# Patient Record
Sex: MALE | Race: WHITE | NOT HISPANIC OR LATINO | Employment: FULL TIME | URBAN - METROPOLITAN AREA
[De-identification: names, ages, dates, MRNs, and addresses within clinical notes are randomized per-mention and may not be internally consistent; named-entity substitution may affect disease eponyms.]

---

## 2017-01-13 ENCOUNTER — OFFICE VISIT (OUTPATIENT)
Dept: INTERNAL MEDICINE | Facility: CLINIC | Age: 47
End: 2017-01-13

## 2017-01-13 VITALS
BODY MASS INDEX: 28.2 KG/M2 | WEIGHT: 212.8 LBS | DIASTOLIC BLOOD PRESSURE: 85 MMHG | HEIGHT: 73 IN | HEART RATE: 92 BPM | OXYGEN SATURATION: 98 % | SYSTOLIC BLOOD PRESSURE: 135 MMHG

## 2017-01-13 DIAGNOSIS — F33.9 EPISODE OF RECURRENT MAJOR DEPRESSIVE DISORDER, UNSPECIFIED DEPRESSION EPISODE SEVERITY (HCC): ICD-10-CM

## 2017-01-13 DIAGNOSIS — Z00.00 HEALTH CARE MAINTENANCE: ICD-10-CM

## 2017-01-13 DIAGNOSIS — F41.9 ANXIETY: ICD-10-CM

## 2017-01-13 DIAGNOSIS — K21.9 GASTROESOPHAGEAL REFLUX DISEASE WITHOUT ESOPHAGITIS: Primary | ICD-10-CM

## 2017-01-13 DIAGNOSIS — D36.7 DERMOID CYST OF ARM, LEFT: ICD-10-CM

## 2017-01-13 PROBLEM — F32.A DEPRESSION: Status: ACTIVE | Noted: 2017-01-13

## 2017-01-13 PROBLEM — E78.5 HYPERLIPIDEMIA: Status: ACTIVE | Noted: 2017-01-13

## 2017-01-13 PROBLEM — R73.02 IMPAIRED GLUCOSE TOLERANCE: Status: ACTIVE | Noted: 2017-01-13

## 2017-01-13 PROCEDURE — 90658 IIV3 VACCINE SPLT 0.5 ML IM: CPT | Performed by: INTERNAL MEDICINE

## 2017-01-13 PROCEDURE — 90472 IMMUNIZATION ADMIN EACH ADD: CPT | Performed by: INTERNAL MEDICINE

## 2017-01-13 PROCEDURE — 90471 IMMUNIZATION ADMIN: CPT | Performed by: INTERNAL MEDICINE

## 2017-01-13 PROCEDURE — 90715 TDAP VACCINE 7 YRS/> IM: CPT | Performed by: INTERNAL MEDICINE

## 2017-01-13 PROCEDURE — 99214 OFFICE O/P EST MOD 30 MIN: CPT | Performed by: INTERNAL MEDICINE

## 2017-01-13 RX ORDER — CITALOPRAM 10 MG/1
10 TABLET ORAL EVERY MORNING
Qty: 90 TABLET | Refills: 3 | Status: SHIPPED | OUTPATIENT
Start: 2017-01-13 | End: 2017-08-04 | Stop reason: SDUPTHER

## 2017-01-13 RX ORDER — CITALOPRAM 20 MG/1
TABLET ORAL
COMMUNITY
Start: 2016-11-11 | End: 2017-01-13 | Stop reason: SDUPTHER

## 2017-01-13 NOTE — PROGRESS NOTES
Subjective     Eduin Garcia is a 46 y.o. male, who presents with a chief complaint of   Chief Complaint   Patient presents with   • Establish Care     pt quit smoking 3x weeks ago and has x questions    • Med Refill     RX Citalopram    • Cyst     pt states there is a small cyst L elbow- over 20x years- notices enlargment recently        HPI Comments: Patient is here to establish care and is doing well. He has not seen a physician in years and was a previous patient of Dr. Irvin/Jerry    Anxiety: chronic issue and has been on Celexa for years. He is currently take 10mg per day and feels well. At times he gets anxious and worries, but feels good on most days. No SI/HI. Stable job and sleeps well. Recently quit smokigna     GERD:chronic issue and has been on Zegrid for years. He has had multiple EGD's that he thinks were normal. He has no reflux symptoms currently. No blood in stool and does not have emesis.     Cyst: has had for several years and is getting larger on left elbow. 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 No change in color or drainage. Has not tried anything for pain. No fever or chills or other associated symptoms.        The following portions of the patient's history were reviewed and updated as appropriate: allergies, current medications, past family history, past medical history, past social history, past surgical history and problem list.    Allergies: Review of patient's allergies indicates no known allergies.    Current Outpatient Prescriptions:   •  citalopram (CeleXA) 10 MG tablet, Take 1 tablet by mouth Every Morning., Disp: 90 tablet, Rfl: 3  •  Multiple Vitamin (MULTI VITAMIN PO), Take  by mouth., Disp: , Rfl:   •  Omeprazole-Sodium Bicarbonate (ZEGERID PO), Take  by mouth., Disp: , Rfl:       Review of Systems   Constitutional: Negative for chills and fever.   HENT: Negative for congestion and rhinorrhea.    Eyes: Negative for pain and discharge.  "  Respiratory: Negative for cough and shortness of breath.    Cardiovascular: Negative for chest pain and palpitations.   Gastrointestinal: Negative for abdominal pain, diarrhea and nausea.   Genitourinary: Negative for difficulty urinating and hematuria.   Musculoskeletal: Negative for gait problem and neck pain.   Skin: Negative for rash.        Cyst on left elbow   Neurological: Negative for dizziness and headaches.   Psychiatric/Behavioral: Negative for agitation, dysphoric mood and suicidal ideas. The patient is nervous/anxious.        Objective     Visit Vitals   • /85 (BP Location: Left arm, Patient Position: Sitting, Cuff Size: Adult)   • Pulse 92   • Ht 73\" (185.4 cm)   • Wt 212 lb 12.8 oz (96.5 kg)   • SpO2 98%   • BMI 28.08 kg/m2         Physical Exam   Constitutional: He is oriented to person, place, and time. He appears well-developed and well-nourished. No distress.   HENT:   Head: Normocephalic and atraumatic.   Right Ear: Tympanic membrane and external ear normal.   Left Ear: Tympanic membrane and external ear normal.   Mouth/Throat: Oropharynx is clear and moist. No oropharyngeal exudate.   Eyes: Conjunctivae are normal. Right eye exhibits no discharge. Left eye exhibits no discharge. No scleral icterus.   Neck: Neck supple.   Cardiovascular: Normal rate, regular rhythm and normal heart sounds.  Exam reveals no gallop and no friction rub.    No murmur heard.  Pulmonary/Chest: Effort normal and breath sounds normal. No respiratory distress. He has no wheezes. He has no rales.   Lymphadenopathy:     He has no cervical adenopathy.   Neurological: He is alert and oriented to person, place, and time.   Skin: Skin is warm. No rash noted.   Fluid filled mobile cyst that is nickel sized near left elbow. Appears to contain cloudy fluid. Tender to touch.   Psychiatric: He has a normal mood and affect. His behavior is normal.   Nursing note and vitals reviewed.    No results found for this or any " previous visit.    Assessment/Plan   Eduin was seen today for establish care, med refill and cyst.    Diagnoses and all orders for this visit:    Gastroesophageal reflux disease without esophagitis: stable in current regimen. Will continue Zegrid. Follow up in 6 months.     Anxiety: stable on current regimen and will continue Celexa at 10 mg. Will follow up in 6 months. Encouraged good sleep hygiene , decreased caffeine and continued exercise.     Episode of recurrent major depressive disorder, unspecified depression episode severity: continue Celexa that he is stable on. Will follow up in 6 months.     Health care maintenance  -     CBC & Differential  -     Comprehensive Metabolic Panel  -     Lipid Panel With LDL / HDL Ratio  -     TSH Rfx On Abnormal To Free T4  -     Urinalysis With / Culture If Indicated  -     Vitamin D 25 Hydroxy    Dermoid cyst of arm, left  -     Ambulatory Referral to Dermatology    Other orders  -     Tdap Vaccine Greater Than or Equal To 8yo IM  -     Flu Vaccine Greater Than or Equal To 2yo Preservative Free IM      Will see back in 6 months for physical and follow up  Return in about 6 months (around 7/13/2017) for Annual physical and follow up of anxiety.    Grace Jeff MD  01/13/2017

## 2017-01-13 NOTE — MR AVS SNAPSHOT
Eduin Garcia   1/13/2017 8:00 AM   Office Visit    Dept Phone:  [phone]   Encounter #:  [id number]    Provider:  Grace Jeff MD   Department:  [facility]                Your Full Care Plan              Today's Medication Changes          These changes are accurate as of: 1/13/17  9:16 AM.  If you have any questions, ask your nurse or doctor.               Medication(s)that have changed:     citalopram 10 MG tablet   Commonly known as:  CeleXA   Take 1 tablet by mouth Every Morning.   What changed:    - medication strength  - how much to take  - how to take this  - when to take this   Changed by:  Grace Jeff MD            Where to Get Your Medications      These medications were sent to [address] - [address] [place] [address] RD. - [phone]  - [phone] [place] [address]     Phone:  [phone]     citalopram 10 MG tablet                  Your Updated Medication List          This list is accurate as of: 1/13/17  9:16 AM.  Always use your most recent med list.                citalopram 10 MG tablet   Commonly known as:  CeleXA   Take 1 tablet by mouth Every Morning.       MULTI VITAMIN PO       ZEGERID PO               We Performed the Following     Ambulatory Referral to Dermatology     CBC & Differential     Comprehensive Metabolic Panel     Flu Vaccine Greater Than or Equal To 4yo Preservative Free IM     Lipid Panel With LDL / HDL Ratio     Tdap Vaccine Greater Than or Equal To 6yo IM     TSH Rfx On Abnormal To Free T4     Urinalysis With / Culture If Indicated     Vitamin D 25 Hydroxy       You Were Diagnosed With        Codes Comments    Gastroesophageal reflux disease without esophagitis    -  Primary ICD-10-CM: K21.9  ICD-9-CM: 530.81     Anxiety     ICD-10-CM: F41.9  ICD-9-CM: 300.00     Episode of recurrent major depressive disorder, unspecified  depression episode severity     ICD-10-CM: F33.9  ICD-9-CM: 296.30     Health care maintenance     ICD-10-CM: Z00.00  ICD-9-CM: V70.0     Dermoid cyst of arm, left     ICD-10-CM: D23.62  ICD-9-CM: 216.6       Instructions     None    Patient Instructions History      Upcoming Appointments     Visit Type Date Time Department    NEW PATIENT 2017  8:00 AM [facility]    OFFICE VISIT 3/2/2017 10:40 AM [facility]      [email or web address] Signup     [facility] allows you to send messages to your doctor, view your test results, renew your prescriptions, schedule appointments, and more. To sign up, go to [employer] and click on the Sign Up Now link in the New User? box. Enter your [facility] Activation Code exactly as it appears below along with the last four digits of your Social Security Number and your Date of Birth () to complete the sign-up process. If you do not sign up before the expiration date, you must request a new code.    [facility] Activation Code: [email or web address]  Expires: 2017  9:16 AM    If you have questions, you can email [email or web address] or call [phone] to talk to our [facility] staff. Remember, [facility] is NOT to be used for urgent needs. For medical emergencies, dial 911.               Other Info from Your Visit           Your Appointments     Mar 02, 2017 10:40 AM EST   Office Visit with Rodolfo Morrell MD   [facility] (--)    [address] [address] [phone]           Arrive 15 minutes prior to appointment.              Allergies     No Known Allergies      Reason for Visit     Establish Care pt quit smoking 3x weeks ago and has x questions     Med Refill RX Citalopram     Cyst pt states there is a small cyst L elbow- over 20x years- notices enlargment recently       Vital Signs     Blood Pressure Pulse Height Weight Oxygen Saturation Body Mass Index    135/85 (BP Location: Left  "arm, Patient Position: Sitting, Cuff Size: Adult) 92 73\" (185.4 cm) 212 lb 12.8 oz (96.5 kg) 98% 28.08 kg/m2    Smoking Status                   Former Smoker           Problems and Diagnoses Noted     Anxiety problem    Depression    Acid reflux disease    High cholesterol or triglycerides    Impaired glucose tolerance    Health maintenance examination        Dermoid cyst          Immunizations Administered     Name Date    Influenza (IM) Preservative Free     Tdap         "

## 2017-01-14 LAB
25(OH)D3+25(OH)D2 SERPL-MCNC: 41.9 NG/ML
ALBUMIN SERPL-MCNC: 4.5 G/DL (ref 3.5–5.2)
ALBUMIN/GLOB SERPL: 1.7 G/DL
ALP SERPL-CCNC: 87 U/L (ref 40–129)
ALT SERPL-CCNC: 27 U/L (ref 5–41)
APPEARANCE UR: CLEAR
AST SERPL-CCNC: 23 U/L (ref 5–40)
BACTERIA #/AREA URNS HPF: NORMAL /HPF
BASOPHILS # BLD AUTO: 0.05 10*3/MM3 (ref 0–0.2)
BASOPHILS NFR BLD AUTO: 0.8 % (ref 0–2)
BILIRUB SERPL-MCNC: 0.3 MG/DL (ref 0.2–1.2)
BILIRUB UR QL STRIP: NEGATIVE
BUN SERPL-MCNC: 12 MG/DL (ref 6–20)
BUN/CREAT SERPL: 11.9 (ref 7–25)
CALCIUM SERPL-MCNC: 9.3 MG/DL (ref 8.6–10.5)
CHLORIDE SERPL-SCNC: 103 MMOL/L (ref 98–107)
CHOLEST SERPL-MCNC: 191 MG/DL (ref 0–200)
CO2 SERPL-SCNC: 27.3 MMOL/L (ref 22–29)
COLOR UR: YELLOW
CREAT SERPL-MCNC: 1.01 MG/DL (ref 0.76–1.27)
EOSINOPHIL # BLD AUTO: 0.11 10*3/MM3 (ref 0.1–0.3)
EOSINOPHIL NFR BLD AUTO: 1.7 % (ref 0–4)
EPI CELLS #/AREA URNS HPF: NORMAL /HPF
ERYTHROCYTE [DISTWIDTH] IN BLOOD BY AUTOMATED COUNT: 12 % (ref 11.5–14.5)
GLOBULIN SER CALC-MCNC: 2.6 GM/DL
GLUCOSE SERPL-MCNC: 110 MG/DL (ref 65–99)
GLUCOSE UR QL: NEGATIVE
HCT VFR BLD AUTO: 45 % (ref 42–52)
HDLC SERPL-MCNC: 64 MG/DL (ref 40–60)
HGB BLD-MCNC: 15.4 G/DL (ref 14–18)
HGB UR QL STRIP: NEGATIVE
IMM GRANULOCYTES # BLD: 0.02 10*3/MM3 (ref 0–0.03)
IMM GRANULOCYTES NFR BLD: 0.3 % (ref 0–0.5)
KETONES UR QL STRIP: NEGATIVE
LDLC SERPL CALC-MCNC: 113 MG/DL (ref 0–100)
LDLC/HDLC SERPL: 1.77 {RATIO}
LEUKOCYTE ESTERASE UR QL STRIP: NEGATIVE
LYMPHOCYTES # BLD AUTO: 1.44 10*3/MM3 (ref 0.6–4.8)
LYMPHOCYTES NFR BLD AUTO: 22.7 % (ref 20–45)
MCH RBC QN AUTO: 30 PG (ref 27–31)
MCHC RBC AUTO-ENTMCNC: 34.2 G/DL (ref 31–37)
MCV RBC AUTO: 87.7 FL (ref 80–94)
MICRO URNS: NORMAL
MICRO URNS: NORMAL
MONOCYTES # BLD AUTO: 0.42 10*3/MM3 (ref 0–1)
MONOCYTES NFR BLD AUTO: 6.6 % (ref 3–8)
MUCOUS THREADS URNS QL MICRO: PRESENT /HPF
NEUTROPHILS # BLD AUTO: 4.29 10*3/MM3 (ref 1.5–8.3)
NEUTROPHILS NFR BLD AUTO: 67.9 % (ref 45–70)
NITRITE UR QL STRIP: NEGATIVE
NRBC BLD AUTO-RTO: 0 /100 WBC (ref 0–0)
PH UR STRIP: 7.5 [PH] (ref 5–7.5)
PLATELET # BLD AUTO: 247 10*3/MM3 (ref 140–500)
POTASSIUM SERPL-SCNC: 4.7 MMOL/L (ref 3.5–5.2)
PROT SERPL-MCNC: 7.1 G/DL (ref 6–8.5)
PROT UR QL STRIP: NEGATIVE
RBC # BLD AUTO: 5.13 10*6/MM3 (ref 4.7–6.1)
RBC #/AREA URNS HPF: NORMAL /HPF
SODIUM SERPL-SCNC: 141 MMOL/L (ref 136–145)
SP GR UR: 1.02 (ref 1–1.03)
TRIGL SERPL-MCNC: 69 MG/DL (ref 0–150)
TSH SERPL DL<=0.005 MIU/L-ACNC: 1.2 MIU/ML (ref 0.27–4.2)
URINALYSIS REFLEX: NORMAL
UROBILINOGEN UR STRIP-MCNC: 0.2 MG/DL (ref 0.2–1)
VLDLC SERPL CALC-MCNC: 13.8 MG/DL (ref 8–32)
WBC # BLD AUTO: 6.33 10*3/MM3 (ref 4.8–10.8)
WBC #/AREA URNS HPF: NORMAL /HPF

## 2017-01-15 NOTE — PROGRESS NOTES
Please call patient with these results and let him know that they are very good including his blood counts, kidney and liver function, thyroid, vitamin D, and urine studies. He does have pre-diabetes on his labs though. His fasting sugar was 110 and 126 and is diagnostic for diabetes. I would encourage him to increase his exercise and improve his food choices as this will help him slowly decrease his weight. There are programs to help as well at the [facility] at [facility] near [place] (close to his house)  that he should look into.These are pre-diabetes programs that help patients not progress to full blown diabetes by exercise, diet and weight loss that is sustainable. We will send him a packet in the mail with some information about pre-diabetes and the [facility] program in case he is interested. We will recheck his labs in 7/2017 to monitor his progress.

## 2017-01-16 ENCOUNTER — TELEPHONE (OUTPATIENT)
Dept: INTERNAL MEDICINE | Facility: CLINIC | Age: 47
End: 2017-01-16

## 2017-01-16 NOTE — TELEPHONE ENCOUNTER
Patient has been advised of results and voiced understanding. The patient states he would like the [facility] packet sent to him in the mail, he is willing to start a program or start exercising.     ----- Message from Grace Jeff MD sent at 1/15/2017  7:44 AM EST -----  I accidentally sent this results message without putting you as the recipient and wanted to make sure that you called him. So sorry!       Please call patient with these results and let him know that they are very good including his blood counts, kidney and liver function, thyroid, vitamin D, and urine studies. He does have pre-diabetes on his labs though. His fasting sugar was 110 and 126 and is diagnostic for diabetes. I would encourage him to increase his exercise and improve his food choices as this will help him slowly decrease his weight. There are programs to help as well at the [facility] at [facility] near [place] (close to his house)  that he should look into.These are pre-diabetes programs that help patients not progress to full blown diabetes by exercise, diet and weight loss that is sustainable. We will send him a packet in the mail with some information about pre-diabetes and the [facility] program in case he is interested. We will recheck his labs in 7/2017 to monitor his progress.

## 2017-08-04 ENCOUNTER — OFFICE VISIT (OUTPATIENT)
Dept: INTERNAL MEDICINE | Facility: CLINIC | Age: 47
End: 2017-08-04

## 2017-08-04 ENCOUNTER — TELEPHONE (OUTPATIENT)
Dept: INTERNAL MEDICINE | Facility: CLINIC | Age: 47
End: 2017-08-04

## 2017-08-04 ENCOUNTER — HOSPITAL ENCOUNTER (OUTPATIENT)
Dept: GENERAL RADIOLOGY | Facility: HOSPITAL | Age: 47
Discharge: HOME | End: 2017-08-04
Attending: INTERNAL MEDICINE | Admitting: INTERNAL MEDICINE

## 2017-08-04 VITALS
WEIGHT: 220.2 LBS | OXYGEN SATURATION: 98 % | BODY MASS INDEX: 29.18 KG/M2 | HEIGHT: 73 IN | SYSTOLIC BLOOD PRESSURE: 130 MMHG | HEART RATE: 97 BPM | DIASTOLIC BLOOD PRESSURE: 78 MMHG

## 2017-08-04 DIAGNOSIS — S99.922S FOOT INJURY, LEFT, SEQUELA: ICD-10-CM

## 2017-08-04 DIAGNOSIS — Z00.00 ANNUAL PHYSICAL EXAM: Primary | ICD-10-CM

## 2017-08-04 DIAGNOSIS — R73.02 IMPAIRED GLUCOSE TOLERANCE: ICD-10-CM

## 2017-08-04 DIAGNOSIS — E78.00 PURE HYPERCHOLESTEROLEMIA: ICD-10-CM

## 2017-08-04 DIAGNOSIS — F41.9 ANXIETY: ICD-10-CM

## 2017-08-04 PROBLEM — E66.3 OVERWEIGHT (BMI 25.0-29.9): Status: ACTIVE | Noted: 2017-08-04

## 2017-08-04 LAB
ALBUMIN SERPL-MCNC: 4.3 G/DL (ref 3.5–5.2)
ALBUMIN/GLOB SERPL: 1.6 G/DL
ALP SERPL-CCNC: 78 U/L (ref 40–129)
ALT SERPL-CCNC: 53 U/L (ref 5–41)
AST SERPL-CCNC: 31 U/L (ref 5–40)
BILIRUB SERPL-MCNC: 0.5 MG/DL (ref 0.2–1.2)
BUN SERPL-MCNC: 11 MG/DL (ref 6–20)
BUN/CREAT SERPL: 9.9 (ref 7–25)
CALCIUM SERPL-MCNC: 9 MG/DL (ref 8.6–10.5)
CHLORIDE SERPL-SCNC: 105 MMOL/L (ref 98–107)
CHOLEST SERPL-MCNC: 183 MG/DL (ref 0–200)
CO2 SERPL-SCNC: 26.2 MMOL/L (ref 22–29)
CREAT SERPL-MCNC: 1.11 MG/DL (ref 0.76–1.27)
GLOBULIN SER CALC-MCNC: 2.7 GM/DL
GLUCOSE SERPL-MCNC: 116 MG/DL (ref 65–99)
HDLC SERPL-MCNC: 56 MG/DL (ref 40–60)
LDLC SERPL CALC-MCNC: 112 MG/DL (ref 0–100)
LDLC/HDLC SERPL: 1.99 {RATIO}
POTASSIUM SERPL-SCNC: 4.6 MMOL/L (ref 3.5–5.2)
PROT SERPL-MCNC: 7 G/DL (ref 6–8.5)
SODIUM SERPL-SCNC: 141 MMOL/L (ref 136–145)
TRIGL SERPL-MCNC: 77 MG/DL (ref 0–150)
VLDLC SERPL CALC-MCNC: 15.4 MG/DL (ref 8–32)

## 2017-08-04 PROCEDURE — 99396 PREV VISIT EST AGE 40-64: CPT | Performed by: INTERNAL MEDICINE

## 2017-08-04 PROCEDURE — 73630 X-RAY EXAM OF FOOT: CPT

## 2017-08-04 PROCEDURE — 93000 ELECTROCARDIOGRAM COMPLETE: CPT | Performed by: INTERNAL MEDICINE

## 2017-08-04 PROCEDURE — 99213 OFFICE O/P EST LOW 20 MIN: CPT | Performed by: INTERNAL MEDICINE

## 2017-08-04 RX ORDER — CITALOPRAM 20 MG/1
20 TABLET ORAL EVERY MORNING
Qty: 30 TABLET | Refills: 6 | Status: SHIPPED | OUTPATIENT
Start: 2017-08-04 | End: 2017-08-04 | Stop reason: SDUPTHER

## 2017-08-04 RX ORDER — CITALOPRAM 20 MG/1
20 TABLET ORAL EVERY MORNING
Qty: 90 TABLET | Refills: 3 | Status: SHIPPED | OUTPATIENT
Start: 2017-08-04 | End: 2018-08-08 | Stop reason: SDUPTHER

## 2017-08-04 NOTE — PROGRESS NOTES
Please call patient with these results and let him know that there is not a fracture. I would like him to start wearing better shoes with more support, but do not squeeze his foot too much. If not getting better, we will send him to have PT.

## 2017-08-04 NOTE — PROGRESS NOTES
Patient Name: Eduin Denny is a 46 y.o. male presenting for Annual Exam (yrly phys, L foot numb issues x questions )    Several months ago, he dropped 50lb desk on his left foot. He has some swelling and was able to walk on it. He has had some tingling and numbness on the lateral side from the midfoot to the heel that occurred a few weeks ago. 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 No CP or SOB when he runs.     His anxiety is not doing well. He quit smoking and he feels that it has worsened. He is only taking 10mg of Celexa. He has been running 3-5 miles 3-4 times per week.     He is eating pretty good. He is eating a sand which and no fast foods. He is eating vegetables and fruits.     Well Adult Physical   Patient here for a comprehensive physical exam.The patient reports problems - foot pain on left.    Do you take any herbs or supplements that were not prescribed by a doctor? no   Are you taking calcium supplements? no   Are you taking aspirin daily? no        Eduin Garcia 46 y.o. male who presents for an Annual Wellness Visit.  he has a history of   Patient Active Problem List   Diagnosis   • Depression   • Hyperlipidemia   • Impaired glucose tolerance   • Gastroesophageal reflux disease without esophagitis   • Anxiety   • Overweight (BMI 25.0-29.9)   He has been doing well with new interval problems.      Health Habits:  Dental Exam. up to date  Eye Exam. up to date  Exercise: 4 times/week.  Current exercise activities include: running       Tob use:Previous smoker  Qualifies for lung Ca screening? N/A       The following portions of the patient's history were reviewed and updated as appropriate: allergies, current medications, past family history, past medical history, past social history, past surgical history and problem list.    Review of Systems   Constitutional: Negative for chills and fever.   HENT: Negative for congestion and rhinorrhea.    Respiratory:  "Negative for cough and shortness of breath.    Cardiovascular: Negative for chest pain, palpitations and leg swelling.   Gastrointestinal: Negative for abdominal pain, constipation, diarrhea, nausea and vomiting.   Genitourinary: Negative for difficulty urinating and dysuria.   Musculoskeletal: Positive for arthralgias and joint swelling.   Neurological: Negative for dizziness and headaches.   Psychiatric/Behavioral: Positive for decreased concentration. Negative for sleep disturbance. The patient is nervous/anxious.        Review of patient's allergies indicates no known allergies.      Current Outpatient Prescriptions:   •  citalopram (CeleXA) 20 MG tablet, Take 1 tablet by mouth Every Morning., Disp: 90 tablet, Rfl: 3  •  Multiple Vitamin (MULTI VITAMIN PO), Take  by mouth., Disp: , Rfl:   •  Omeprazole-Sodium Bicarbonate (ZEGERID PO), Take  by mouth., Disp: , Rfl:     OBJECTIVE    /78 (BP Location: Left arm, Patient Position: Sitting, Cuff Size: Adult)  Pulse 97  Ht 73\" (185.4 cm)  Wt 220 lb 3.2 oz (99.9 kg)  SpO2 98%  BMI 29.05 kg/m2      Physical Exam   Constitutional: He is oriented to person, place, and time. He appears well-developed and well-nourished. No distress.   HENT:   Head: Normocephalic and atraumatic.   Right Ear: Hearing, tympanic membrane and external ear normal.   Left Ear: Hearing, tympanic membrane and external ear normal.   Nose: Nose normal.   Mouth/Throat: Uvula is midline, oropharynx is clear and moist and mucous membranes are normal. No oropharyngeal exudate. Tonsils are 0 on the right. Tonsils are 0 on the left. No tonsillar exudate.   Eyes: Conjunctivae, EOM and lids are normal. Pupils are equal, round, and reactive to light. Right eye exhibits no discharge. Left eye exhibits no discharge. No scleral icterus.   Neck: Trachea normal and full passive range of motion without pain. Neck supple. Carotid bruit is not present. No thyroid mass and no thyromegaly present. "   Cardiovascular: Normal rate, regular rhythm and normal heart sounds.  Exam reveals no gallop and no friction rub.    No murmur heard.  Pulmonary/Chest: Effort normal and breath sounds normal. No respiratory distress. He has no wheezes. He has no rales.   Abdominal: Soft. Bowel sounds are normal. He exhibits no distension and no mass. There is no tenderness. There is no guarding.   Musculoskeletal: He exhibits no edema or tenderness.   Lymphadenopathy:     He has no cervical adenopathy.   Neurological: He is alert and oriented to person, place, and time. No cranial nerve deficit. He exhibits normal muscle tone. Coordination and gait normal.   Reflex Scores:       Tricep reflexes are 2+ on the right side and 2+ on the left side.       Bicep reflexes are 2+ on the right side and 2+ on the left side.       Brachioradialis reflexes are 2+ on the right side and 2+ on the left side.       Patellar reflexes are 2+ on the right side and 2+ on the left side.  Skin: Skin is warm. No rash noted.   Psychiatric: His behavior is normal. His mood appears anxious.   Nursing note and vitals reviewed.        ECG 12 Lead  Date/Time: 8/4/2017 9:32 AM  Performed by: CHAD OZUNA  Authorized by: CHAD OZUNA   Comparison: not compared with previous ECG   Previous ECG: no previous ECG available  Rhythm: sinus rhythm  Rate: normal  Conduction: non-specific intraventricular conduction delay  ST Segments: ST segments normal  T Waves: T waves normal  QRS axis: normal  Other: no other findings  Clinical impression: normal ECG and non-specific ECG          ASSESSMENT AND PLAN  Update vaccines if indicated.Will start to follow a low fat, low cholesterol diet, attempt to lose weight, decrease or avoid alcohol intake, reduce salt in diet and cooking, reduce exposure to stress, improve dietary compliance, continue current medications and return for routine annual checkups.     Patient's BG's have been elevated and their HgA1c is pending.  Will continue to focus on diet and exercise. Will follow up in 6-12 months depending on results of check.     Patient's cholesterol was high on last check and has not been rechecked. Will continue current regimen of diet and exercise. Will follow up in 6-12 months to monitor levels depending on results of labs today.     I think that patient may have had a fracture and will check x-ray. Depending on results, we will send to specialist wither ortho or podiatry.     Anxiety is under fair control and is stable since he stopped smoking. I discussed with him that he is on a low dose of Celexa and reasonable to increase. Will increase to 20mg once per day. Encouraged increased exercise, meditation, and stress relief. Depending on labs, will likely see back in 4-6 months for this.       Eduin was seen today for annual exam.    Diagnoses and all orders for this visit:    Annual physical exam  -     ECG 12 Lead    Pure hypercholesterolemia  -     Lipid Panel With LDL / HDL Ratio    Impaired glucose tolerance  -     Comprehensive Metabolic Panel  -     Hemoglobin A1c    Foot injury, left, sequela  -     XR Foot 3+ View Left    Anxiety    Other orders  -     Discontinue: citalopram (CeleXA) 20 MG tablet; Take 1 tablet by mouth Every Morning.  -     citalopram (CeleXA) 20 MG tablet; Take 1 tablet by mouth Every Morning.         Return in about 1 year (around 8/4/2018) for Annual physical.

## 2017-08-04 NOTE — TELEPHONE ENCOUNTER
----- Message from Grace Jeff MD sent at 8/4/2017 11:13 AM EDT -----  Please call patient with these results and let him know that there is not a fracture. I would like him to start wearing better shoes with more support, but do not squeeze his foot too much. If not getting better, we will send him to have PT.      Pt given results of xray. dg

## 2017-08-08 ENCOUNTER — TELEPHONE (OUTPATIENT)
Dept: INTERNAL MEDICINE | Facility: CLINIC | Age: 47
End: 2017-08-08

## 2017-08-08 NOTE — TELEPHONE ENCOUNTER
----- Message from Grace Jeff MD sent at 8/8/2017  7:51 AM EDT -----  Please call patient with these results and let him know that his cholesterol and blood sugar are still elevated. I want him to work more on his diet and exercise as we discussed in clinic. He also has a slight elevation in one of his liver enzymes that is likely related to the other abnormalities . I would like him to work really hard on loosing some weight and we will recheck all his labs in 3-4 months to see what progress he has made with follow up appointment afterwards. Please schedule and he will need lipid, HgA1c and CMP prior to appointment. Please make sure that his HgA1c is sent, I don't see the results for this yet?

## 2017-08-08 NOTE — PROGRESS NOTES
Please call patient with these results and let him know that his cholesterol and blood sugar are still elevated. I want him to work more on his diet and exercise as we discussed in clinic. He also has a slight elevation in one of his liver enzymes that is likely related to the other abnormalities . I would like him to work really hard on loosing some weight and we will recheck all his labs in 3-4 months to see what progress he has made with follow up appointment afterwards. Please schedule and he will need lipid, HgA1c and CMP prior to appointment. Please make sure that his HgA1c is sent, I don't see the results for this yet?

## 2017-08-08 NOTE — TELEPHONE ENCOUNTER
----- Message from Grace Jeff MD sent at 8/8/2017  7:51 AM EDT -----  Please call patient with these results and let him know that his cholesterol and blood sugar are still elevated. I want him to work more on his diet and exercise as we discussed in clinic. He also has a slight elevation in one of his liver enzymes that is likely related to the other abnormalities . I would like him to work really hard on loosing some weight and we will recheck all his labs in 3-4 months to see what progress he has made with follow up appointment afterwards. Please schedule and he will need lipid, HgA1c and CMP prior to appointment. Please make sure that his HgA1c is sent, I don't see the results for this yet?    Pt given lab resultsdg

## 2018-08-08 RX ORDER — CITALOPRAM 20 MG/1
20 TABLET ORAL EVERY MORNING
Qty: 90 TABLET | Refills: 0 | Status: SHIPPED | OUTPATIENT
Start: 2018-08-08 | End: 2018-09-07 | Stop reason: SDUPTHER

## 2018-08-13 RX ORDER — CITALOPRAM 20 MG/1
TABLET ORAL
Qty: 90 TABLET | Refills: 1 | Status: SHIPPED | OUTPATIENT
Start: 2018-08-13 | End: 2018-09-07 | Stop reason: SDUPTHER

## 2018-08-28 DIAGNOSIS — Z00.00 ROUTINE ADULT HEALTH MAINTENANCE: Primary | ICD-10-CM

## 2018-08-28 DIAGNOSIS — E78.00 PURE HYPERCHOLESTEROLEMIA: ICD-10-CM

## 2018-08-28 DIAGNOSIS — Z13.29 SCREENING FOR HYPOTHYROIDISM: ICD-10-CM

## 2018-08-28 DIAGNOSIS — R73.02 IMPAIRED GLUCOSE TOLERANCE: ICD-10-CM

## 2018-08-29 LAB
ALBUMIN SERPL-MCNC: 4.3 G/DL (ref 3.5–5.2)
ALBUMIN/GLOB SERPL: 1.7 G/DL
ALP SERPL-CCNC: 86 U/L (ref 40–129)
ALT SERPL-CCNC: 27 U/L (ref 5–41)
APPEARANCE UR: CLEAR
AST SERPL-CCNC: 25 U/L (ref 5–40)
BACTERIA #/AREA URNS HPF: NORMAL /HPF
BASOPHILS # BLD AUTO: 0.03 10*3/MM3 (ref 0–0.2)
BASOPHILS NFR BLD AUTO: 0.4 % (ref 0–2)
BILIRUB SERPL-MCNC: 0.3 MG/DL (ref 0.2–1.2)
BILIRUB UR QL STRIP: NEGATIVE
BUN SERPL-MCNC: 10 MG/DL (ref 6–20)
BUN/CREAT SERPL: 10.3 (ref 7–25)
CALCIUM SERPL-MCNC: 9.3 MG/DL (ref 8.6–10.5)
CHLORIDE SERPL-SCNC: 102 MMOL/L (ref 98–107)
CHOLEST SERPL-MCNC: 146 MG/DL (ref 0–200)
CO2 SERPL-SCNC: 29.2 MMOL/L (ref 22–29)
COLOR UR: YELLOW
CREAT SERPL-MCNC: 0.97 MG/DL (ref 0.76–1.27)
EOSINOPHIL # BLD AUTO: 0.08 10*3/MM3 (ref 0.1–0.3)
EOSINOPHIL NFR BLD AUTO: 1 % (ref 0–4)
EPI CELLS #/AREA URNS HPF: NORMAL /HPF
ERYTHROCYTE [DISTWIDTH] IN BLOOD BY AUTOMATED COUNT: 12.8 % (ref 11.5–14.5)
GLOBULIN SER CALC-MCNC: 2.5 GM/DL
GLUCOSE SERPL-MCNC: 110 MG/DL (ref 65–99)
GLUCOSE UR QL: NEGATIVE
HBA1C MFR BLD: 5.4 % (ref 4.8–5.6)
HCT VFR BLD AUTO: 47.3 % (ref 42–52)
HDLC SERPL-MCNC: 57 MG/DL (ref 40–60)
HGB BLD-MCNC: 16 G/DL (ref 14–18)
HGB UR QL STRIP: NEGATIVE
IMM GRANULOCYTES # BLD: 0.03 10*3/MM3 (ref 0–0.03)
IMM GRANULOCYTES NFR BLD: 0.4 % (ref 0–0.5)
KETONES UR QL STRIP: NEGATIVE
LDLC SERPL CALC-MCNC: 78 MG/DL (ref 0–100)
LDLC/HDLC SERPL: 1.38 {RATIO}
LEUKOCYTE ESTERASE UR QL STRIP: NEGATIVE
LYMPHOCYTES # BLD AUTO: 1.13 10*3/MM3 (ref 0.6–4.8)
LYMPHOCYTES NFR BLD AUTO: 14.6 % (ref 20–45)
MCH RBC QN AUTO: 30.8 PG (ref 27–31)
MCHC RBC AUTO-ENTMCNC: 33.8 G/DL (ref 31–37)
MCV RBC AUTO: 91 FL (ref 80–94)
MICRO URNS: ABNORMAL
MICRO URNS: ABNORMAL
MONOCYTES # BLD AUTO: 0.45 10*3/MM3 (ref 0–1)
MONOCYTES NFR BLD AUTO: 5.8 % (ref 3–8)
MUCOUS THREADS URNS QL MICRO: PRESENT /HPF
NEUTROPHILS # BLD AUTO: 6.01 10*3/MM3 (ref 1.5–8.3)
NEUTROPHILS NFR BLD AUTO: 77.8 % (ref 45–70)
NITRITE UR QL STRIP: NEGATIVE
NRBC BLD AUTO-RTO: 0 /100 WBC (ref 0–0)
PH UR STRIP: 8 [PH] (ref 5–7.5)
PLATELET # BLD AUTO: 234 10*3/MM3 (ref 140–500)
POTASSIUM SERPL-SCNC: 4.4 MMOL/L (ref 3.5–5.2)
PROT SERPL-MCNC: 6.8 G/DL (ref 6–8.5)
PROT UR QL STRIP: NEGATIVE
RBC # BLD AUTO: 5.2 10*6/MM3 (ref 4.7–6.1)
RBC #/AREA URNS HPF: NORMAL /HPF
SODIUM SERPL-SCNC: 142 MMOL/L (ref 136–145)
SP GR UR: 1.02 (ref 1–1.03)
TRIGL SERPL-MCNC: 53 MG/DL (ref 0–150)
TSH SERPL DL<=0.005 MIU/L-ACNC: 0.98 MIU/ML (ref 0.27–4.2)
URINALYSIS REFLEX: ABNORMAL
UROBILINOGEN UR STRIP-MCNC: 0.2 MG/DL (ref 0.2–1)
VLDLC SERPL CALC-MCNC: 10.6 MG/DL (ref 8–32)
WBC # BLD AUTO: 7.73 10*3/MM3 (ref 4.8–10.8)
WBC #/AREA URNS HPF: NORMAL /HPF

## 2018-09-07 ENCOUNTER — OFFICE VISIT (OUTPATIENT)
Dept: INTERNAL MEDICINE | Facility: CLINIC | Age: 48
End: 2018-09-07

## 2018-09-07 VITALS
BODY MASS INDEX: 26.64 KG/M2 | HEART RATE: 97 BPM | DIASTOLIC BLOOD PRESSURE: 82 MMHG | WEIGHT: 201 LBS | SYSTOLIC BLOOD PRESSURE: 122 MMHG | OXYGEN SATURATION: 98 % | HEIGHT: 73 IN | RESPIRATION RATE: 18 BRPM

## 2018-09-07 DIAGNOSIS — R73.02 IMPAIRED GLUCOSE TOLERANCE: ICD-10-CM

## 2018-09-07 DIAGNOSIS — Z13.29 SCREENING FOR HYPOTHYROIDISM: ICD-10-CM

## 2018-09-07 DIAGNOSIS — Z13.220 SCREENING FOR HYPERLIPIDEMIA: ICD-10-CM

## 2018-09-07 DIAGNOSIS — E66.3 OVERWEIGHT (BMI 25.0-29.9): ICD-10-CM

## 2018-09-07 DIAGNOSIS — K21.9 GASTROESOPHAGEAL REFLUX DISEASE WITHOUT ESOPHAGITIS: ICD-10-CM

## 2018-09-07 DIAGNOSIS — F41.9 ANXIETY: ICD-10-CM

## 2018-09-07 DIAGNOSIS — Z00.00 ENCOUNTER FOR ANNUAL PHYSICAL EXAM: Primary | ICD-10-CM

## 2018-09-07 DIAGNOSIS — F33.9 EPISODE OF RECURRENT MAJOR DEPRESSIVE DISORDER, UNSPECIFIED DEPRESSION EPISODE SEVERITY (HCC): ICD-10-CM

## 2018-09-07 PROBLEM — E78.5 HYPERLIPIDEMIA: Status: RESOLVED | Noted: 2017-01-13 | Resolved: 2018-09-07

## 2018-09-07 PROCEDURE — 99213 OFFICE O/P EST LOW 20 MIN: CPT | Performed by: INTERNAL MEDICINE

## 2018-09-07 PROCEDURE — 99396 PREV VISIT EST AGE 40-64: CPT | Performed by: INTERNAL MEDICINE

## 2018-09-07 RX ORDER — CITALOPRAM 20 MG/1
20 TABLET ORAL EVERY MORNING
Qty: 30 TABLET | Refills: 11 | Status: SHIPPED | OUTPATIENT
Start: 2018-09-07 | End: 2019-09-12 | Stop reason: SDUPTHER

## 2018-09-07 NOTE — PROGRESS NOTES
"Patient Name: Eduin Denny is a 48 y.o. male presenting for Annual Exam (CPE, lab results )    He is feeling well. He has been walking and has lost about 20lbs since I saw him last. He is eating better. Trying to eat less carbs. He is drinking lots of water. He does drink sugar free energy in the morning as well as coffee.     He feels that the Celexa is helping, but the exercise has helped. Sometimes depressed in the morning, but does \"think a lot\". He wants to stay on medication for now and doesn't want to change right now. He is sleeping well at night. He is living a lone.     He is still taking Zegrid and feels well on this. He is alternating every other day on this medication.     Well Adult Physical   Patient here for a comprehensive physical exam.The patient reports problems - anixety/depression, GERD, obesity     Do you take any herbs or supplements that were not prescribed by a doctor? no   Are you taking calcium supplements? no   Are you taking aspirin daily? no      Eduin Garcia 48 y.o. male who presents for an Annual Wellness Visit.  he has a history of   Patient Active Problem List   Diagnosis   • Depression   • Impaired glucose tolerance   • Gastroesophageal reflux disease without esophagitis   • Anxiety   • Overweight (BMI 25.0-29.9)       Health Habits:  Dental Exam. up to date  Eye Exam. not up to date - needs to schedule   Exercise: 4 times/week.  Current exercise activities include: walking    Prostate cancer screening:will get at 50 years of age   Colonoscopy:50 years of age  Tob use:N/A   Qualifies for lung Ca screening?N/A       The following portions of the patient's history were reviewed and updated as appropriate: allergies, current medications, past family history, past medical history, past social history, past surgical history and problem list.    Review of Systems   Constitutional: Negative for chills, fatigue and fever.   Respiratory: Negative for cough and shortness " "of breath.    Cardiovascular: Negative for chest pain.   Gastrointestinal: Negative for abdominal pain, constipation, diarrhea, nausea and vomiting.   Genitourinary: Negative for difficulty urinating and dysuria.   Skin: Negative for rash.   Neurological: Negative for dizziness and headaches.   Psychiatric/Behavioral: Positive for dysphoric mood. Negative for sleep disturbance. The patient is nervous/anxious.        Patient has no known allergies.      Current Outpatient Prescriptions:   •  citalopram (CeleXA) 20 MG tablet, Take 1 tablet by mouth Every Morning., Disp: 30 tablet, Rfl: 11  •  Multiple Vitamin (MULTI VITAMIN PO), Take  by mouth., Disp: , Rfl:   •  Omeprazole-Sodium Bicarbonate (ZEGERID PO), Take  by mouth Daily., Disp: , Rfl:     OBJECTIVE    /82 (BP Location: Left arm, Patient Position: Sitting, Cuff Size: Adult)   Pulse 97   Resp 18   Ht 185.4 cm (72.99\")   Wt 91.2 kg (201 lb)   SpO2 98%   BMI 26.52 kg/m²     Physical Exam   Constitutional: He is oriented to person, place, and time. He appears well-developed and well-nourished. No distress.   HENT:   Head: Normocephalic and atraumatic.   Right Ear: Hearing, tympanic membrane, external ear and ear canal normal.   Left Ear: Hearing, tympanic membrane, external ear and ear canal normal.   Nose: Nose normal.   Mouth/Throat: Uvula is midline, oropharynx is clear and moist and mucous membranes are normal. No oropharyngeal exudate, posterior oropharyngeal edema or posterior oropharyngeal erythema. Tonsils are 0 on the right. Tonsils are 0 on the left. No tonsillar exudate.   Eyes: Conjunctivae are normal. Right eye exhibits no discharge. Left eye exhibits no discharge. No scleral icterus.   Neck: Trachea normal. Neck supple. Carotid bruit is not present. No thyroid mass and no thyromegaly present.   Cardiovascular: Normal rate, regular rhythm and normal heart sounds.  Exam reveals no gallop and no friction rub.    No murmur " heard.  Pulmonary/Chest: Effort normal and breath sounds normal. No respiratory distress. He has no wheezes. He has no rales.   Abdominal: Soft. Bowel sounds are normal. He exhibits no distension and no mass. There is no tenderness. There is no guarding.   Lymphadenopathy:     He has no cervical adenopathy.   Neurological: He is alert and oriented to person, place, and time.   Skin: Skin is warm. No rash noted.   Psychiatric: He has a normal mood and affect. His behavior is normal.   Nursing note and vitals reviewed.        ASSESSMENT AND PLAN  Flu shot in the fall. I want Eduin to begin a progressive daily aerobic exercise program, follow a low fat, low cholesterol diet, attempt to lose weight, decrease or avoid alcohol intake, reduce salt in diet and cooking, reduce exposure to stress, improve dietary compliance, continue current medications, continue current healthy lifestyle patterns and return for routine annual checkups.     GERD is stable and he is doing well on PPI.     Patient's BG's have been elevated and their HgA1c is 5.4%. Will continue to focus on diet and exercise. Will follow up in 12 months.     HLD has resolved. Will check next year to make sure that he is still doing well.     Continue on Celexa once daily. He is doing well on this and will see back in 12 months.     Eduin was seen today for annual exam.    Diagnoses and all orders for this visit:    Encounter for annual physical exam    Gastroesophageal reflux disease without esophagitis  -     CBC & Differential; Future  -     Comprehensive Metabolic Panel; Future    Impaired glucose tolerance  -     Comprehensive Metabolic Panel; Future  -     Hemoglobin A1c; Future  -     Urinalysis With Culture If Indicated - Urine, Clean Catch; Future    Episode of recurrent major depressive disorder, unspecified depression episode severity (CMS/[facility])    Anxiety    Overweight (BMI 25.0-29.9)    Screening for hyperlipidemia  -     Lipid Panel With LDL / HDL  Ratio; Future    Screening for hypothyroidism  -     TSH Rfx On Abnormal To Free T4; Future    Other orders  -     citalopram (CeleXA) 20 MG tablet; Take 1 tablet by mouth Every Morning.         Return in about 1 year (around 9/7/2019) for Annual physical.

## 2019-09-07 LAB
ALBUMIN SERPL-MCNC: 4.5 G/DL (ref 3.5–5.2)
ALBUMIN/GLOB SERPL: 1.9 G/DL
ALP SERPL-CCNC: 87 U/L (ref 39–117)
ALT SERPL-CCNC: 27 U/L (ref 1–41)
APPEARANCE UR: CLEAR
AST SERPL-CCNC: 21 U/L (ref 1–40)
BACTERIA #/AREA URNS HPF: NORMAL /HPF
BASOPHILS # BLD AUTO: 0.03 10*3/MM3 (ref 0–0.2)
BASOPHILS NFR BLD AUTO: 0.4 % (ref 0–1.5)
BILIRUB SERPL-MCNC: 0.3 MG/DL (ref 0.2–1.2)
BILIRUB UR QL STRIP: NEGATIVE
BUN SERPL-MCNC: 12 MG/DL (ref 6–20)
BUN/CREAT SERPL: 11.5 (ref 7–25)
CALCIUM SERPL-MCNC: 9.6 MG/DL (ref 8.6–10.5)
CHLORIDE SERPL-SCNC: 103 MMOL/L (ref 98–107)
CHOLEST SERPL-MCNC: 157 MG/DL (ref 0–200)
CO2 SERPL-SCNC: 29.8 MMOL/L (ref 22–29)
COLOR UR: YELLOW
CREAT SERPL-MCNC: 1.04 MG/DL (ref 0.76–1.27)
EOSINOPHIL # BLD AUTO: 0.17 10*3/MM3 (ref 0–0.4)
EOSINOPHIL NFR BLD AUTO: 2.2 % (ref 0.3–6.2)
EPI CELLS #/AREA URNS HPF: NORMAL /HPF
ERYTHROCYTE [DISTWIDTH] IN BLOOD BY AUTOMATED COUNT: 12.5 % (ref 12.3–15.4)
GLOBULIN SER CALC-MCNC: 2.4 GM/DL
GLUCOSE SERPL-MCNC: 113 MG/DL (ref 65–99)
GLUCOSE UR QL: NEGATIVE
HBA1C MFR BLD: 5.7 % (ref 4.8–5.6)
HCT VFR BLD AUTO: 47.8 % (ref 37.5–51)
HDLC SERPL-MCNC: 57 MG/DL (ref 40–60)
HGB BLD-MCNC: 15.8 G/DL (ref 13–17.7)
HGB UR QL STRIP: NEGATIVE
IMM GRANULOCYTES # BLD AUTO: 0.02 10*3/MM3 (ref 0–0.05)
IMM GRANULOCYTES NFR BLD AUTO: 0.3 % (ref 0–0.5)
KETONES UR QL STRIP: NEGATIVE
LDLC SERPL CALC-MCNC: 90 MG/DL (ref 0–100)
LDLC/HDLC SERPL: 1.58 {RATIO}
LEUKOCYTE ESTERASE UR QL STRIP: NEGATIVE
LYMPHOCYTES # BLD AUTO: 1.31 10*3/MM3 (ref 0.7–3.1)
LYMPHOCYTES NFR BLD AUTO: 17.3 % (ref 19.6–45.3)
MCH RBC QN AUTO: 31 PG (ref 26.6–33)
MCHC RBC AUTO-ENTMCNC: 33.1 G/DL (ref 31.5–35.7)
MCV RBC AUTO: 93.9 FL (ref 79–97)
MICRO URNS: NORMAL
MICRO URNS: NORMAL
MONOCYTES # BLD AUTO: 0.54 10*3/MM3 (ref 0.1–0.9)
MONOCYTES NFR BLD AUTO: 7.1 % (ref 5–12)
MUCOUS THREADS URNS QL MICRO: PRESENT /HPF
NEUTROPHILS # BLD AUTO: 5.49 10*3/MM3 (ref 1.7–7)
NEUTROPHILS NFR BLD AUTO: 72.7 % (ref 42.7–76)
NITRITE UR QL STRIP: NEGATIVE
NRBC BLD AUTO-RTO: 0 /100 WBC (ref 0–0.2)
PH UR STRIP: 7 [PH] (ref 5–7.5)
PLATELET # BLD AUTO: 233 10*3/MM3 (ref 140–450)
POTASSIUM SERPL-SCNC: 4.4 MMOL/L (ref 3.5–5.2)
PROT SERPL-MCNC: 6.9 G/DL (ref 6–8.5)
PROT UR QL STRIP: NEGATIVE
RBC # BLD AUTO: 5.09 10*6/MM3 (ref 4.14–5.8)
RBC #/AREA URNS HPF: NORMAL /HPF
SODIUM SERPL-SCNC: 143 MMOL/L (ref 136–145)
SP GR UR: 1.02 (ref 1–1.03)
TRIGL SERPL-MCNC: 49 MG/DL (ref 0–150)
TSH SERPL DL<=0.005 MIU/L-ACNC: 1.05 UIU/ML (ref 0.27–4.2)
URINALYSIS REFLEX: NORMAL
UROBILINOGEN UR STRIP-MCNC: 0.2 MG/DL (ref 0.2–1)
VLDLC SERPL CALC-MCNC: 9.8 MG/DL
WBC # BLD AUTO: 7.56 10*3/MM3 (ref 3.4–10.8)
WBC #/AREA URNS HPF: NORMAL /HPF

## 2019-09-09 ENCOUNTER — RESULTS ENCOUNTER (OUTPATIENT)
Dept: INTERNAL MEDICINE | Facility: CLINIC | Age: 49
End: 2019-09-09

## 2019-09-09 DIAGNOSIS — K21.9 GASTROESOPHAGEAL REFLUX DISEASE WITHOUT ESOPHAGITIS: ICD-10-CM

## 2019-09-09 DIAGNOSIS — R73.02 IMPAIRED GLUCOSE TOLERANCE: ICD-10-CM

## 2019-09-09 DIAGNOSIS — Z13.29 SCREENING FOR HYPOTHYROIDISM: ICD-10-CM

## 2019-09-09 DIAGNOSIS — Z13.220 SCREENING FOR HYPERLIPIDEMIA: ICD-10-CM

## 2019-09-12 ENCOUNTER — OFFICE VISIT (OUTPATIENT)
Dept: INTERNAL MEDICINE | Facility: CLINIC | Age: 49
End: 2019-09-12

## 2019-09-12 VITALS
HEIGHT: 73 IN | OXYGEN SATURATION: 98 % | SYSTOLIC BLOOD PRESSURE: 122 MMHG | HEART RATE: 100 BPM | TEMPERATURE: 98.1 F | RESPIRATION RATE: 14 BRPM | WEIGHT: 207 LBS | BODY MASS INDEX: 27.43 KG/M2 | DIASTOLIC BLOOD PRESSURE: 72 MMHG

## 2019-09-12 DIAGNOSIS — Z00.00 ENCOUNTER FOR ANNUAL PHYSICAL EXAM: Primary | ICD-10-CM

## 2019-09-12 DIAGNOSIS — B07.0 PLANTAR WART OF LEFT FOOT: ICD-10-CM

## 2019-09-12 DIAGNOSIS — N52.9 ERECTILE DYSFUNCTION, UNSPECIFIED ERECTILE DYSFUNCTION TYPE: ICD-10-CM

## 2019-09-12 PROCEDURE — 99396 PREV VISIT EST AGE 40-64: CPT | Performed by: INTERNAL MEDICINE

## 2019-09-12 PROCEDURE — 17110 DESTRUCTION B9 LES UP TO 14: CPT | Performed by: INTERNAL MEDICINE

## 2019-09-12 RX ORDER — CITALOPRAM 20 MG/1
20 TABLET ORAL EVERY MORNING
Qty: 90 TABLET | Refills: 2 | Status: SHIPPED | OUTPATIENT
Start: 2019-09-12

## 2019-09-12 RX ORDER — SILDENAFIL 25 MG/1
25 TABLET, FILM COATED ORAL DAILY PRN
Qty: 30 TABLET | Refills: 0 | Status: SHIPPED | OUTPATIENT
Start: 2019-09-12

## 2019-09-12 NOTE — PROGRESS NOTES
Patient Name: Eduin Denny is a 49 y.o. male presenting for Annual Exam (CPE, lab results, L foot- toe issue)    He is doing well. No current issues other than a lesion on his left little toe. Has been there a while. Hurts for him to wear steel toned boots.     He has been drinking a lot [place]. He drinks in the morning.     He has been seeing someone that is younger than him. He does have some ED and wants to consider mediation. He is doing well on his Celexa with no issues. Eats well on most day.     He is still iron working.   Well Adult Physical   Patient here for a comprehensive physical exam.The patient reports problems - left foot    Do you take any herbs or supplements that were not prescribed by a doctor? no   Are you taking calcium supplements? no   Are you taking aspirin daily? no      Eduin Garcia 49 y.o. male who presents for an Annual Wellness Visit.  he has a history of   Patient Active Problem List   Diagnosis   • Depression   • Impaired glucose tolerance   • Gastroesophageal reflux disease without esophagitis   • Anxiety   • Overweight (BMI 25.0-29.9)   • Plantar wart of left foot   • Erectile dysfunction       Health Habits:  Dental Exam. up to date  Eye Exam. up to date  Exercise: 2 times/week.Current exercise activities include: walking  Colonoscopy:due next year   Tob use:N/A   Qualifies for lung Ca screening?N/ A    Cryotherapy, Skin Lesion  Date/Time: 9/12/2019 2:05 PM  Performed by: Grace Jeff MD  Authorized by: Grace Jeff MD   Consent: Verbal consent obtained.  Risks and benefits: risks, benefits and alternatives were discussed  Consent given by: patient  Patient understanding: patient states understanding of the procedure being performed  Preparation: Patient was prepped and draped in the usual sterile fashion.  Local anesthesia used: no    Anesthesia:  Local anesthesia used: no    Sedation:  Patient sedated: no    Patient tolerance: Patient tolerated the  "procedure well with no immediate complications          The following portions of the patient's history were reviewed and updated as appropriate: allergies, current medications, past family history, past medical history, past social history, past surgical history and problem list.    Review of Systems   Constitutional: Negative for chills, fatigue and fever.   HENT: Negative for congestion and rhinorrhea.    Respiratory: Negative for cough and shortness of breath.    Genitourinary: Negative for difficulty urinating and dysuria.   Skin: Negative for rash.   Neurological: Negative for dizziness and headaches.   Psychiatric/Behavioral: The patient is nervous/anxious.        Patient has no known allergies.      Current Outpatient Medications:   •  citalopram (CeleXA) 20 MG tablet, Take 1 tablet by mouth Every Morning., Disp: 90 tablet, Rfl: 2  •  Multiple Vitamin (MULTI VITAMIN PO), Take  by mouth., Disp: , Rfl:   •  Omeprazole-Sodium Bicarbonate (ZEGERID PO), Take  by mouth Daily., Disp: , Rfl:   •  sildenafil (VIAGRA) 25 MG tablet, Take 1 tablet by mouth Daily As Needed for erectile dysfunction., Disp: 30 tablet, Rfl: 0    OBJECTIVE    /72 (BP Location: Left arm, Patient Position: Sitting, Cuff Size: Large Adult)   Pulse 100   Temp 98.1 °F (36.7 °C) (Oral)   Resp 14   Ht 185.4 cm (73\")   Wt 93.9 kg (207 lb) Comment: steel toe boots  SpO2 98%   BMI 27.31 kg/m²     Physical Exam   Constitutional: He is oriented to person, place, and time. He appears well-developed and well-nourished. No distress.   HENT:   Head: Normocephalic and atraumatic.   Right Ear: Hearing, tympanic membrane, external ear and ear canal normal.   Left Ear: Hearing, tympanic membrane, external ear and ear canal normal.   Nose: Nose normal.   Mouth/Throat: Uvula is midline, oropharynx is clear and moist and mucous membranes are normal. No oropharyngeal exudate, posterior oropharyngeal edema or posterior oropharyngeal erythema. Tonsils " are 0 on the right. Tonsils are 0 on the left. No tonsillar exudate.   Eyes: Conjunctivae and lids are normal. Right eye exhibits no discharge. Left eye exhibits no discharge. No scleral icterus.   Neck: Trachea normal. Neck supple. No thyroid mass and no thyromegaly present.   Cardiovascular: Normal rate, regular rhythm, normal heart sounds and intact distal pulses. Exam reveals no gallop and no friction rub.   No murmur heard.  Pulmonary/Chest: Effort normal and breath sounds normal. No respiratory distress. He has no wheezes. He has no rales.   Abdominal: Soft. Bowel sounds are normal. He exhibits no distension and no mass. There is no tenderness. There is no guarding.   Musculoskeletal: He exhibits no edema.   Lymphadenopathy:     He has no cervical adenopathy.   Neurological: He is alert and oriented to person, place, and time.   Skin: Skin is warm. Capillary refill takes less than 2 seconds. Lesion (left pinkie toe plantar wart) noted. No rash noted. No pallor.   Psychiatric: He has a normal mood and affect. His behavior is normal.   Nursing note and vitals reviewed.        ASSESSMENT AND PLAN    Discussed preventative counseling regarding progressive daily aerobic exercise program, follow a low fat, low cholesterol diet, attempt to lose weight, decrease or avoid alcohol intake, reduce salt in diet and cooking, reduce exposure to stress, improve dietary compliance, continue current medications and return for routine annual checkups.     Viagra sent for ED. Discussed side effects and how to use.     Wart was frozen in office and did well with this. Will use apple cider vinegar or duck tape in a week.       Eduin was seen today for annual exam.    Diagnoses and all orders for this visit:    Encounter for annual physical exam    Plantar wart of left foot    Erectile dysfunction, unspecified erectile dysfunction type    Other orders  -     sildenafil (VIAGRA) 25 MG tablet; Take 1 tablet by mouth Daily As Needed for  erectile dysfunction.  -     citalopram (CeleXA) 20 MG tablet; Take 1 tablet by mouth Every Morning.  -     Cryotherapy, Skin Lesion            Return in about 1 year (around 9/12/2020) for Annual physical, Recheck.

## 2020-06-25 RX ORDER — CITALOPRAM 20 MG/1
TABLET ORAL
Qty: 30 TABLET | Refills: 1 | OUTPATIENT
Start: 2020-06-25

## 2020-06-30 RX ORDER — CITALOPRAM 20 MG/1
20 TABLET ORAL EVERY MORNING
Qty: 90 TABLET | Refills: 2 | OUTPATIENT
Start: 2020-06-30

## 2020-07-01 RX ORDER — CITALOPRAM 20 MG/1
TABLET ORAL
Qty: 30 TABLET | Refills: 1 | OUTPATIENT
Start: 2020-07-01